# Patient Record
Sex: FEMALE | Race: WHITE
[De-identification: names, ages, dates, MRNs, and addresses within clinical notes are randomized per-mention and may not be internally consistent; named-entity substitution may affect disease eponyms.]

---

## 2021-07-14 ENCOUNTER — DASHBOARD ENCOUNTERS (OUTPATIENT)
Age: 81
End: 2021-07-14

## 2021-07-14 ENCOUNTER — OFFICE VISIT (OUTPATIENT)
Dept: URBAN - METROPOLITAN AREA CLINIC 118 | Facility: CLINIC | Age: 81
End: 2021-07-14
Payer: MEDICARE

## 2021-07-14 DIAGNOSIS — R13.10 DYSPHAGIA, UNSPECIFIED TYPE: ICD-10-CM

## 2021-07-14 PROCEDURE — 99204 OFFICE O/P NEW MOD 45 MIN: CPT | Performed by: INTERNAL MEDICINE

## 2021-07-14 NOTE — HPI-TODAY'S VISIT:
pt presents for difficulty swallowing. pt reports recent problem with swallowing solids. pt denies recent diet or med changes. Denies noted gerd, nausea, vomiting. No weight loss or anemia. No LGI symptoms. No recent GI evaluation.
Detail Level: Detailed
Otc Regimen: mediplast sheets apply to wart for 24.  After taking a shower rube with sheet of sand paper, then reapply mediplast repeat for 4-6 months

## 2021-10-01 ENCOUNTER — TELEPHONE ENCOUNTER (OUTPATIENT)
Dept: URBAN - METROPOLITAN AREA CLINIC 92 | Facility: CLINIC | Age: 81
End: 2021-10-01

## 2021-10-01 PROBLEM — 40739000: Status: ACTIVE | Noted: 2021-10-01
